# Patient Record
Sex: FEMALE | Race: WHITE | NOT HISPANIC OR LATINO | Employment: OTHER | URBAN - METROPOLITAN AREA
[De-identification: names, ages, dates, MRNs, and addresses within clinical notes are randomized per-mention and may not be internally consistent; named-entity substitution may affect disease eponyms.]

---

## 2017-06-29 ENCOUNTER — HOSPITAL ENCOUNTER (EMERGENCY)
Facility: MEDICAL CENTER | Age: 72
End: 2017-06-29
Attending: EMERGENCY MEDICINE
Payer: MEDICARE

## 2017-06-29 VITALS
RESPIRATION RATE: 18 BRPM | WEIGHT: 178 LBS | HEIGHT: 68 IN | OXYGEN SATURATION: 93 % | HEART RATE: 86 BPM | TEMPERATURE: 98.8 F | BODY MASS INDEX: 26.98 KG/M2

## 2017-06-29 DIAGNOSIS — T85.598A FEEDING TUBE DYSFUNCTION, INITIAL ENCOUNTER: ICD-10-CM

## 2017-06-29 PROCEDURE — 303761 HCHG FEEDING TUBE

## 2017-06-29 PROCEDURE — 43760 HCHG GASTROSTOMY TUBE CHANGE: CPT

## 2017-06-29 PROCEDURE — 99284 EMERGENCY DEPT VISIT MOD MDM: CPT

## 2017-06-29 ASSESSMENT — PAIN SCALES - GENERAL: PAINLEVEL_OUTOF10: 0

## 2017-06-29 NOTE — ED NOTES
Pt continues to be anxious about when she will be leaving, explained waiting for ambulance, pt verbalizes understanding

## 2017-06-29 NOTE — ED PROVIDER NOTES
ED Provider Note    Scribed for Remi Novak M.D. by Dontae Delgadillo. 6/29/2017, 2:49 AM.    Primary care provider: Pcp Pt States None  Means of arrival: Ambulance  History obtained from: Patient  History limited by: None    CHIEF COMPLAINT  Chief Complaint   Patient presents with   • Feeding Tube Problem     pt had GLF, G tube pulled out during fall       HPI  Sofya Humphrey is a 72 y.o. female from [facility] who presents to the Emergency Department due to an issue with a feeding tube. At 4:00 PM, 12 hours ago, the patient had a ground level fall and the G tube was pulled out of her abdomen. Patient chronically has G-tube that was apparently displaced when bathing the patient. Patient is at [facility] due to recent ischemic stroke that has resulted in profound dysarthria and aphasia further limiting the history of present illness however no other issues reported from transferring facility has been recently afebrile with baseline status.      REVIEW OF SYSTEMS  As per HPI otherwise limited by patient's chronic conditions.      PAST MEDICAL HISTORY   has a past medical history of Back pain; Hypothyroid; Colitis; Fall; Hypertension; [place] (hard of hearing); History of Carias-Joe toxic epidermal necrolysis overlap syndrome; History of Reye's syndrome; Arthritis; Arrhythmia; Indigestion; Asthma; Osteoarthritis; Rotator cuff injury (8/13/16); Hypothyroidism; History of bowel resection (2014); Umbilical hernia; Anemia; and Hepatitis A infection.    SURGICAL HISTORY   has past surgical history that includes other abdominal surgery.    SOCIAL HISTORY  Social History   Substance Use Topics   • Smoking status: Never Smoker    • Smokeless tobacco: Never Used   • Alcohol Use: No      History   Drug Use No       FAMILY HISTORY  Non-Contributory    CURRENT MEDICATIONS  No current facility-administered medications on file prior to encounter.     Current Outpatient Prescriptions on File Prior to Encounter  "  Medication Sig Dispense Refill   • ibuprofen (MOTRIN) 600 MG Tab Take 1 Tab by mouth every 6 hours as needed. 30 Tab 0   • oxycodone immediate-release (ROXICODONE) 5 MG Tab Take 1 Tab by mouth every 6 hours as needed. 30 Tab 0   • risperidone (RISPERDAL) 1 MG Tab Take 1 Tab by mouth at bedtime as needed (for sleep). 30 Tab 0   • sucralfate (CARAFATE) 1 GM Tab Take 1 Tab by mouth 4 Times a Day,Before Meals and at Bedtime. 120 Tab 3   • therapeutic multivitamin-minerals (THERAGRAN-M) Tab Take 1 Tab by mouth every day.     • oxycodone-acetaminophen (PERCOCET) 7.5-325 MG per tablet Take 1 Tab by mouth every four hours as needed for Moderate Pain.     • levothyroxine (SYNTHROID) 125 MCG Tab Take 1 Tab by mouth Every morning on an empty stomach. 20 Tab 0   • ferrous sulfate 325 (65 FE) MG tablet Take 325 mg by mouth every day. 90 Tab 0   • lisinopril (PRINIVIL) 20 MG Tab Take 1 Tab by mouth every day. Pt states I only take 1 tablet QDAY, but yesterday I took and extra tablet because I thought it would help my BP. 30 Tab 2   • morphine SR (MS CONTIN) 30 MG TABLET SR 12 HR Take 1 Tab by mouth 3 times a day. 9 Tab 0       ALLERGIES  Allergies   Allergen Reactions   • Aspirin      History of Reye's syndrome   • Sulfa Drugs Rash and Swelling     Pt states \"I get jayant Joe's syndrome, rash, and swelling\".     • Codeine Vomiting and Nausea   • Seroquel [Quetiapine Fumarate] Unspecified     Patient has nightmares       PHYSICAL EXAM  VITAL SIGNS: Pulse 78  Temp(Src) 37.1 °C (98.8 °F)  Resp 16  Ht 1.727 m (5' 8\")  Wt 80.74 kg (178 lb)  BMI 27.07 kg/m2  Constitutional: Alert and interactive no acute distress  HEENT: Atraumatic normocephalic, pupils are equal round reactive to light extraocular movements are intact. The nares is clear, external ears are normal, mouth shows moist mucous membranes  Neck: Supple, no JVD no tracheal deviation  Cardiovascular: Regular rate and rhythm no murmur rub or gallop 2+ pulses " peripherally x4  Thorax & Lungs: No respiratory distress, no wheezes rales or rhonchi, No chest tenderness.   GI: Soft nontender nondistended positive bowel sounds, no peritoneal signs, open G-tube site of left upper quadrant with minimal excoriation    Skin: Warm dry no acute rash or lesion  Musculoskeletal: Moving extremities at baseline no acute  deformity  Neurologic: Cranial nerves III through XII are grossly intact, no sensory deficit, no cerebellar dysfunction   Psychiatric: Appropriate affect for situation at this time      COURSE & MEDICAL DECISION MAKING  Pertinent Labs & Imaging studies reviewed. (See chart for details)    2:49 AM - Patient seen and examined at bedside. A catheter was placed in the site where the feeding tube was to ensure it would not close.     G-tube was obtained from central supply and place with no difficulties gastric contents easily aspirated and flows easily without pain. Patient discharged back to care facility in stable condition.        FINAL IMPRESSION  1. Feeding tube dysfunction, initial encounter           This dictation has been created using voice recognition software and/or scribes. The accuracy of the dictation is limited by the abilities of the software and the expertise of the scribes. I expect there may be some errors of grammar and possibly content. I made every attempt to manually correct the errors within my dictation. However, errors related to voice recognition software and/or scribes may still exist and should be interpreted within the appropriate context.     Dontae STONER (Scribe), am scribing for, and in the presence of, Remi Novak M.D..    Electronically signed by: Dontae Delgadillo (Scribe), 6/29/2017    Remi STONER M.D. personally performed the services described in this documentation, as scribed by Dontae Delgadillo in my presence, and it is both accurate and complete.    The note accurately reflects work and decisions made by me.   Remi Novak  6/29/2017  8:11 AM

## 2017-06-29 NOTE — DISCHARGE INSTRUCTIONS
Feeding Tube Use  Some people who have trouble swallowing or cannot take food or medicine by mouth may need a feeding tube. A feeding tube can go into the nose and down to the stomach or small bowel or through the skin in the abdomen and into the stomach or small bowel. Liquid food (formula), breast milk, or medicines may be given through the tube.   SUPPLIES NEEDED FOR A TUBE FEEDING   · Clean gloves.  · Prescribed formula or breast milk.  · Appropriate feeding bag set, gravity drip tubing set, or 30-60-mL syringe with feeding extension tubing.  · Feeding tube pump or syringe pump as needed.  · Pole as needed.  · 20-60-mL syringe to check tube placement.  · A syringe to flush the feeding tube.  · Container.  · Water.  GIVING A FEEDING THROUGH A FEEDING TUBE PUMP  1. Have all supplies ready and available.  2. Wash hands well.  3. Put on clean gloves.  4. Check the placement of the feeding tube as directed.  5. Elevate the head of the person 30-45 degrees or as directed.  6. Pour up to 4 hours of room-temperature feeding into the feeding bag set.  7. Hang the feeding bag set from a pole.  8. Flush the entire feeding bag set with the feeding.  9. Load the feeding bag set into the feeding tube pump.  10. Cap the feeding bag set.  11. Uncap the feeding tube.  12. Using a syringe, flush the feeding tube with water as directed.  13. Uncap the feeding bag set.  14. Connect the feeding bag set to the feeding tube.  15. Remove gloves.  16. Wash hands well.  17. Set the prescribed feeding rate.  18. Start the feeding tube pump.  GIVING A FEEDING THROUGH A SYRINGE PUMP   1. Have all supplies ready and available.  2. Wash hands well.  3. Put on clean gloves.  4. Check the placement of the feeding tube as directed.  5. Elevate the head of the person 30-45 degrees or as directed.  6. Draw up to 4 hours of room-temperature formula into the correctly sized syringe.  7. Attach the syringe to the feeding extension tubing.  8. Flush  the entire feeding extension tubing with the feeding.  9. Load the syringe and feeding extension tubing into the syringe pump.  10. Cap the feeding extension tubing.  11. Uncap the feeding tube.  12. Using a syringe, flush the feeding tube with water as directed.  13. Uncap the feeding extension tubing.  14. Connect the feeding extension tubing to the feeding tube.  15. Remove gloves.  16. Wash hands well.  17. Set the prescribed feeding rate.  18. Start the syringe pump.  GIVING A FEEDING BY GRAVITY   1. Have all supplies ready and available.  2. Wash hands well.  3. Put on clean gloves.  4. Check the placement of the feeding tube as directed.  5. Elevate the head of the person 30-45 degrees or as directed.  6. Close the clamp on the gravity drip tubing set.  7. Pour up to 4 hours of room-temperature feeding into the bag of a gravity drip tubing set. Or, if using a ready-to-hang formula container, connect the gravity drip tubing set to the ready-to-hang container.  8. Hang the feeding with the attached gravity drip tubing set from a pole.  9. Open the roller clamp on the gravity drip tubing to fill the entire tubing with the feeding.  10. Close the roller clamp.  11. Cap the gravity drip tubing.  12. Uncap the feeding tube.  13. Using a syringe, flush the feeding tube with water as directed.  14. Uncap the gravity drip tubing.  15. Connect the gravity drip tubing to the feeding tube.  16. Using the roller clamp, adjust the feeding rate to deliver the feeding at the prescribed rate.  17. Remove gloves.  18. Wash hands well.  GIVING A BOLUS FEEDING THROUGH A FEEDING TUBE  Remove the plunger from the syringe. Attach the syringe to the feeding tube. Pour the feeding into the syringe and administer at the prescribed rate by means of gravity. A feeding bag may also be used for bolus feedings, depending on the volume of feeding given.   SUPPLIES NEEDED FOR GIVING MEDICINES THROUGH A FEEDING TUBE  · 60-mL  syringe.  · Container.  · Water.  · Medicine.  · Pill  if medicine is in tablet form.  · Clean gloves.  GIVING MEDICINES THROUGH A FEEDING TUBE   1. Have all supplies ready and available.  2. Wash hands well.  3. If the medicine cannot be given with the feeding, stop the feeding 60 minutes before giving the medicine.  4. If the person needs to take medicine on an empty stomach, consider not giving a feeding for up to 2 hours (hold time) before giving medicine. Talk to the caregiver about appropriate hold times.  5. Fill a container with  mL of warm water.  6. Prepare medicine that will go into the feeding tube.  ¨ Liquid--Measure the prescribed medicine dose.  ¨ Tablet--Crush the tablet using a pill-crushing device. Grind the pill to a fine powder. Dissolve the powder in at least 30 mL of warm water or as directed. If there is more than 1 tablet, crush and dilute each one individually.  ¨ Capsule--Open a capsule containing dry pellets or yu a capsule containing liquid (gelcap). Empty the contents in 30 mL of warm water or as directed. Gelcaps can also be dissolved in warm water.  7. Elevate the head of the person 30-45 degrees or as directed.  8. Wash hands well.  9. Put on clean gloves.  10. If a continuous tube feeding is infusing, stop the tube feeding.  11. If applicable, close the tubing clamp.  12. Disconnect the feeding bag set or gravity drip tubing set from the feeding tube.  13. Cap the feeding bag set or gravity drip tubing set.  14. Check the placement of the feeding tube as directed.  15. Draw up 30 mL of water into a syringe or as directed.  16. Insert the tip of the syringe into the feeding tube.  17. Using the syringe, flush the feeding tube with water.  18. Remove the plunger of the syringe or replace the syringe with a syringe that contains medicine.  19. Give the medicine as directed.  ¨ If giving only 1 dose of medicine, flush the feeding tube with water after giving the  medicine.  ¨ If giving more than 1 dose of medicine, give each separately. Flush the feeding tube between medicines and after the last dose of medicine.  20. If a tube feeding will not be continued after the medicine, cap the feeding tube. Position the person to a comfortable position, but keep his or her head elevated for 1 hour after giving the medicine.  21. If a tube feeding will be continued after the medicine, but the medicine cannot be given with the feeding, continue to hold the feeding for 30-60 minutes after the medicine is given. When appropriate, reconnect the feeding bag set or gravity drip tubing set to the feeding tube.  22. Throw away or clean used supplies as directed.  23. Remove gloves.  24. Wash hands well.     This information is not intended to replace advice given to you by your health care provider. Make sure you discuss any questions you have with your health care provider.     Document Released: 12/04/2013 Document Reviewed: 12/04/2013  Elsevier Interactive Patient Education ©2016 [employer]

## 2017-06-29 NOTE — ED NOTES
Pt continues to be anxious, reassured after sitting and talking with pt. Pt aware ERP will be in to see her.

## 2017-06-29 NOTE — ED NOTES
"Chief Complaint   Patient presents with   • Feeding Tube Problem     pt had GLF, G tube pulled out during fall     Pulse 78  Temp(Src) 37.1 °C (98.8 °F)  Resp 16  Ht 1.727 m (5' 8\")  Wt 80.74 kg (178 lb)  BMI 27.07 kg/m2    BIB EMS. Pt from [facility], had GLF, G tube was pulled out during fall. Pt is non ambulatory but uses WC at baseline, per report fall possibly when transitioning to out of bed to . Hx CVA , R side hemiplegia, anxiety, HTN. Pt anxious on arrival. A/O x2 to self and place, per report is baseline. Slurred speech at baseline from hx CVA.   "

## 2017-06-29 NOTE — DISCHARGE PLANNING
Medical Social Work  [facility] contacted to assist in transport back to [facility].  [facility] completed COBRA and PCS form.   [facility] contacted  For transport.  Transfer packet placed on chart.

## 2017-08-23 ENCOUNTER — HOSPITAL ENCOUNTER (OUTPATIENT)
Dept: RADIOLOGY | Facility: MEDICAL CENTER | Age: 72
End: 2017-08-23
Attending: FAMILY MEDICINE
Payer: MEDICARE

## 2021-01-14 DIAGNOSIS — Z23 NEED FOR VACCINATION: ICD-10-CM
